# Patient Record
Sex: MALE | Race: BLACK OR AFRICAN AMERICAN | NOT HISPANIC OR LATINO | Employment: OTHER | RURAL
[De-identification: names, ages, dates, MRNs, and addresses within clinical notes are randomized per-mention and may not be internally consistent; named-entity substitution may affect disease eponyms.]

---

## 2021-09-08 DIAGNOSIS — L30.9 DERMATITIS: Primary | ICD-10-CM

## 2022-10-05 DIAGNOSIS — M62.81 GENERALIZED MUSCLE WEAKNESS: Primary | ICD-10-CM

## 2022-10-12 ENCOUNTER — CLINICAL SUPPORT (OUTPATIENT)
Dept: REHABILITATION | Facility: HOSPITAL | Age: 52
End: 2022-10-12
Payer: MEDICARE

## 2022-10-12 DIAGNOSIS — M62.81 GENERALIZED MUSCLE WEAKNESS: ICD-10-CM

## 2022-10-12 PROCEDURE — 97161 PT EVAL LOW COMPLEX 20 MIN: CPT

## 2022-10-12 NOTE — PLAN OF CARE
"[facility]  ASSISTIVE TECHNOLOGY EVALUATION    Date: 10/12/2022   Name: Paramjit Clark  Clinic Number: [id number]    Therapy Diagnosis:   Encounter Diagnosis   Name Primary?    Generalized muscle weakness      Physician: Ervin Sage II, MD    Physician Orders: PT Eval and Treat for power chair  Medical Diagnosis from Referral: generalized muscle weakness  Evaluation Date: 10/12/2022  Visit # / Visits authorized: 1/ 1    Time In: 0935  Time Out: 1005  Total Appointment Time (timed & untimed codes): 30 minutes    Precautions: Standard    Past Medical History  Generalized muscle weakness, DM, kidney disease with dialysis, dialysis port surgery with stent placement,  diabetic neuropathy    Pain at time of evaluation: 7/10 Severe  Pain at worst: 10/10 Worst Pain Imaginable  Pain at rest: 2/10 Slight    Factors that increase pain:   standing and walking    Factors that decrease pain:  resting    Current Durable Medical Equipment  Cane, wheelchair    Home environment  Patient lives with wife, traditional one story home, with a threshold at entrance.   Interior door width: unsure but estimates 32 inches  Exterior door width: estimates 34"  Comments:     Patient's mobility complaints: He reports he cannot walk to complete MRADL's due to severe LBP and right hip pain.  He has a history of falls with the most recent about 2 months ago when walking on his front porch. He cannot complete his morning breakfast routine due to pain with standing.     Objective Evaluation:    ROM Right upper extremity  Left upper extremity   Right lower extremity  Left lower extremity    Shoulder flexion  95 100 Hip flexion 100 100   Shoulder IR/ER WFL WFL Hip extension  10 10   Shoulder ABD 90 95 Knee extension  0 0   Elbow flexion/ext WFL WFL Knee flexion  WFL WFL   Wrist flexion/ext WFL WFL Ankle DF WFL WFL   Finger flexion/ext WFL WFL Ankle PF WFL WFL             Strength Right upper extremity  Left upper extremity   Right lower " "extremity  Left lower extremity    Shoulder flex +3/5 +3/5 Hip flexion  +3/5 4/5   Shoulder IR/ER +3/5 +3/5 Hip extension  +3/5 +3/5   Shoulder ABD +3/5 +3/5 Knee extension  +3/5 4/5   Elbow flexion/ext +3/5 -4/5 Knee flexion  +3/5 -4/5   Wrist flexion/ext +3/5 -4/5 Ankle DF +3/5 4/5   Finger flexion/ext 4/5 4/5 Ankle PF +3/5 4/5             Sensation: diminished in bilateral feet and 4th and 5th digits of both hands.     History of pressure sores: none    Transfers:  Sit to/from Stand  Modified Independent  Stand Pivot Transfer Modified Independent  Supine to/from Sit Independent    Cognitive Status: WFL    Activities of Daily Living:  Feeding: Independent  Dressing: Independent  Bathing: Independent  Toileting: Independent    Balance Assessment:  Static Sit Independent  Dynamic Sit Independent  Static Stand Independent  Dynamic Stand Supervision or Set-up Assistance    Postural Assessment:  Head and Neck: WFL  Upper Extremities: WFL (right UE has dialysis port)  Trunk: WFL  Pelvis: WFL  Lower Extremities: WFL    Spasticity: none    Gait Analysis: Patient ambulates with no assistive device with wide REYNA and antalgic gait.  He completed the TUG test in 16.5 seconds with report of increased right hip and back pain to a 7/10.     Body Measurements(all in inches):  Seat to top of head 35   Hip width 22   Chest width 16   Shoulder width 22   Outer knee NT   Inner knee NT     Right  Left  Shoulder Height 25 25   Axilla to seat 15 15   Elbow to seat 8 8   Thigh Length 22 22   Lower leg length 23 23   Height 6'5"  Weight 295lbs  DME Provider: [facility]    Nutrition: WFL  Weight loss/gain in past 2 months: no  Difficulty swallowing: no    Assessment and Recommendations:    A walker will not meet this patient's mobility needs secondary to patient reports his back pain becomes unbearable even with the use of a cane or walker.    A manual wheelchair will not meet this patient's mobility needs because He does " not have the UE strength nor the functional use of his right UE due to his dialysis port to be able to self propel even an optimally configured MWC due to his body weight of 295lbs.     A power wheelchair is needed to meet this patient's mobility needs because: he will be able to complete his household mobility with a scooter with less risk of fall and less back and hip pain, as well as less risk of fall.    The wheelchair recommended is: heavy duty POV with standard seating.  He demonstrates independent trunk control to safely sit in and operate the POV.  He can perform safe transfers into and out of a POV, and he has the necessary UE use to be able to operate the tiller.     Electronically signed by:  MARIA G DOMINGUEZ, PT, ATP  10/12/2022    I CERTIFY THE NEED FOR THESE SERVICES FURNISHED UNDER THIS PLAN OF TREATMENT AND WHILE UNDER MY CARE.    Physician's comments:      Physician's Signature: _________________________________________  Date: __________________________

## 2025-03-17 ENCOUNTER — TELEPHONE (OUTPATIENT)
Dept: VASCULAR SURGERY | Facility: CLINIC | Age: 55
End: 2025-03-17
Payer: MEDICARE

## 2025-03-20 ENCOUNTER — TELEPHONE (OUTPATIENT)
Dept: VASCULAR SURGERY | Facility: CLINIC | Age: 55
End: 2025-03-20
Payer: MEDICARE

## 2025-03-20 ENCOUNTER — DOCUMENTATION ONLY (OUTPATIENT)
Dept: VASCULAR SURGERY | Facility: CLINIC | Age: 55
End: 2025-03-20
Payer: MEDICARE

## 2025-03-20 NOTE — TELEPHONE ENCOUNTER
Returned phone call to Mera with Dr. Ashby office. Discussed current situation with [facility]. Requested arterial duplex per DIANA Ricci. Dr. Ashby's office will send over results once ultrasound is obtained.

## 2025-03-20 NOTE — TELEPHONE ENCOUNTER
Returned phone call and explained situation with [facility]. We will contact Dr. Nix office to obtain US of upper extremity.

## 2025-03-20 NOTE — TELEPHONE ENCOUNTER
----- Message from Jennifer sent at 3/20/2025 10:19 AM CDT -----  Regarding: talk to the nurse  Who Called: Paramjit Tesfaye from dr monsalve officeCaller is requesting assistance/information from provider's office.Symptoms (please be specific): Mera would like a call back from the nurse to discuss the patient Preferred Method of Contact: Phone CallBest Call Back Number, if different: [phone] Information: Ask to Mera

## 2025-03-25 ENCOUNTER — OUTSIDE PLACE OF SERVICE (OUTPATIENT)
Dept: VASCULAR SURGERY | Facility: CLINIC | Age: 55
End: 2025-03-25
Payer: MEDICARE